# Patient Record
Sex: FEMALE | Race: WHITE
[De-identification: names, ages, dates, MRNs, and addresses within clinical notes are randomized per-mention and may not be internally consistent; named-entity substitution may affect disease eponyms.]

---

## 2018-03-02 ENCOUNTER — HOSPITAL ENCOUNTER (EMERGENCY)
Dept: HOSPITAL 19 - COL.ER | Age: 23
Discharge: HOME | End: 2018-03-02
Payer: OTHER GOVERNMENT

## 2018-03-02 VITALS — SYSTOLIC BLOOD PRESSURE: 123 MMHG | HEART RATE: 78 BPM | DIASTOLIC BLOOD PRESSURE: 70 MMHG

## 2018-03-02 VITALS — WEIGHT: 135.36 LBS | HEIGHT: 67.99 IN | BODY MASS INDEX: 20.52 KG/M2

## 2018-03-02 DIAGNOSIS — F43.10: ICD-10-CM

## 2018-03-02 DIAGNOSIS — Z91.5: ICD-10-CM
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
## 2018-03-02 DIAGNOSIS — F32.9: ICD-10-CM

## 2018-03-02 DIAGNOSIS — F91.8: ICD-10-CM

## 2018-03-02 DIAGNOSIS — F41.9: ICD-10-CM

## 2018-03-02 DIAGNOSIS — S71.111A: Primary | ICD-10-CM

## 2018-03-02 LAB
ALBUMIN SERPL-MCNC: 4 GM/DL (ref 3.5–5)
ALP SERPL-CCNC: 46 U/L (ref 50–136)
ALT SERPL-CCNC: 30 U/L (ref 9–52)
ANION GAP SERPL CALC-SCNC: 10 MMOL/L (ref 7–16)
APAP SERPL-MCNC: < 10 UG/ML (ref 10–30)
AST SERPL-CCNC: 22 U/L (ref 15–37)
BASOPHILS # BLD: 0 10*3/UL (ref 0–0.2)
BASOPHILS NFR BLD AUTO: 0.2 % (ref 0–2)
BILIRUB SERPL-MCNC: 0.3 MG/DL (ref 0–1)
BUN SERPL-MCNC: 9 MG/DL (ref 7–17)
CALCIUM SERPL-MCNC: 9.4 MG/DL (ref 8.4–10.2)
CHLORIDE SERPL-SCNC: 102 MMOL/L (ref 98–107)
CO2 SERPL-SCNC: 25 MMOL/L (ref 22–30)
CREAT SERPL-SCNC: 0.93 MG/DL (ref 0.52–1.25)
DRUGS UR SCN NOM: NEGATIVE NG/ML
EOSINOPHIL # BLD: 0 10*3/UL (ref 0–0.7)
EOSINOPHIL NFR BLD: 0.2 % (ref 0–4)
ERYTHROCYTE [DISTWIDTH] IN BLOOD BY AUTOMATED COUNT: 13.9 % (ref 11.5–14.5)
ETHANOL SPEC-SCNC: < 10 MG/DL
GLUCOSE SERPL-MCNC: 98 MG/DL (ref 74–106)
GRANULOCYTES # BLD AUTO: 59.5 % (ref 42.2–75.2)
HCT VFR BLD AUTO: 36.3 % (ref 37–47)
HGB BLD-MCNC: 11.8 G/DL (ref 12.5–16)
LYMPHOCYTES # BLD: 3.3 10*3/UL (ref 1.2–3.4)
LYMPHOCYTES NFR BLD: 33.2 % (ref 20–51)
MCH RBC QN AUTO: 29 PG (ref 27–31)
MCHC RBC AUTO-ENTMCNC: 33 G/DL (ref 33–37)
MCV RBC AUTO: 88 FL (ref 80–100)
MONOCYTES # BLD: 0.7 10*3/UL (ref 0.1–0.6)
MONOCYTES NFR BLD AUTO: 6.6 % (ref 1.7–9.3)
NEUTROPHILS # BLD: 5.9 10*3/UL (ref 1.4–6.5)
PH UR STRIP.AUTO: 5 [PH] (ref 5–8)
PLATELET # BLD AUTO: 267 K/MM3 (ref 130–400)
PMV BLD AUTO: 9 FL (ref 7.4–10.4)
POTASSIUM SERPL-SCNC: 3.7 MMOL/L (ref 3.4–5)
PROT SERPL-MCNC: 7.2 GM/DL (ref 6.4–8.2)
RBC # BLD AUTO: 4.12 M/MM3 (ref 4.1–5.3)
RBC # UR: (no result) /HPF
SALICYLATES SERPL-MCNC: < 1 MG/DL
SODIUM SERPL-SCNC: 138 MMOL/L (ref 137–145)
SP GR UR STRIP.AUTO: 1.02 (ref 1–1.03)
SQUAMOUS # URNS: (no result) /HPF
URN COLLECT METHOD CLASS: (no result)

## 2020-01-20 ENCOUNTER — HOSPITAL ENCOUNTER (OUTPATIENT)
Dept: HOSPITAL 19 - MC.RAD | Age: 25
End: 2020-01-20
Payer: OTHER GOVERNMENT

## 2020-01-20 DIAGNOSIS — N63.31: Primary | ICD-10-CM

## 2020-07-20 ENCOUNTER — HOSPITAL ENCOUNTER (OUTPATIENT)
Dept: HOSPITAL 19 - MC.RAD | Age: 25
End: 2020-07-20
Payer: OTHER GOVERNMENT

## 2020-07-20 DIAGNOSIS — N63.10: Primary | ICD-10-CM

## 2020-09-09 ENCOUNTER — HOSPITAL ENCOUNTER (OUTPATIENT)
Dept: HOSPITAL 19 - LDR | Age: 25
Discharge: HOME | End: 2020-09-09
Payer: OTHER GOVERNMENT

## 2020-09-09 VITALS — BODY MASS INDEX: 28.23 KG/M2 | HEIGHT: 67.99 IN | WEIGHT: 186.29 LBS

## 2020-09-09 VITALS — TEMPERATURE: 98.5 F | HEART RATE: 75 BPM | DIASTOLIC BLOOD PRESSURE: 74 MMHG | SYSTOLIC BLOOD PRESSURE: 123 MMHG

## 2020-09-09 DIAGNOSIS — Z3A.38: ICD-10-CM

## 2020-09-09 NOTE — NUR
G2L0. 38-5. Ambulatory to [facility] 5 with spouse. Clean gown on. EFM and TOCO
explained and applied. Pt states she has been having contraction since 0700
this morning, states her last one was an hour and half ago. Pt reports pain
that starts in her lower back and radiates to her lower abdomen. Pt denies
leaking of fluids or vaginal bleeding. States she did have "pink tinged"
discharge this morning after using the restroom but nothing since then.
Reports good fetal movement.
1840: SVE 1/50/-3. Assessment completed. Plan of care explained. Call light
within reach.

## 2020-09-09 NOTE — NUR
SVE unchanged.
1942: Pt off monitors to use restroom.
1944: Dr.Goodpasture updated on pt status. See physican notificaiton.
1955: Discharge instructions given to pt and . Questions answered. Pt
ambulatory off unit and home with

## 2020-09-16 ENCOUNTER — HOSPITAL ENCOUNTER (INPATIENT)
Dept: HOSPITAL 19 - LDRO | Age: 25
LOS: 2 days | Discharge: HOME | End: 2020-09-18
Attending: OBSTETRICS & GYNECOLOGY | Admitting: OBSTETRICS & GYNECOLOGY
Payer: OTHER GOVERNMENT

## 2020-09-16 VITALS — TEMPERATURE: 97.5 F | HEART RATE: 89 BPM | SYSTOLIC BLOOD PRESSURE: 119 MMHG | DIASTOLIC BLOOD PRESSURE: 79 MMHG

## 2020-09-16 VITALS — DIASTOLIC BLOOD PRESSURE: 79 MMHG | SYSTOLIC BLOOD PRESSURE: 119 MMHG | HEART RATE: 89 BPM | TEMPERATURE: 97.5 F

## 2020-09-16 VITALS — HEIGHT: 67.99 IN | BODY MASS INDEX: 28.5 KG/M2 | WEIGHT: 188.05 LBS

## 2020-09-16 DIAGNOSIS — Z3A.39: ICD-10-CM

## 2020-09-16 DIAGNOSIS — L40.4: ICD-10-CM

## 2020-09-16 LAB
BASOPHILS # BLD: 0 10*3/UL (ref 0–0.2)
BASOPHILS NFR BLD AUTO: 0.2 % (ref 0–2)
EOSINOPHIL # BLD: 0.1 10*3/UL (ref 0–0.7)
EOSINOPHIL NFR BLD: 0.3 % (ref 0–4)
ERYTHROCYTE [DISTWIDTH] IN BLOOD BY AUTOMATED COUNT: 14.5 % (ref 11.5–14.5)
GRANULOCYTES # BLD AUTO: 77.5 % (ref 42.2–75.2)
HCT VFR BLD AUTO: 40.9 % (ref 37–47)
HGB BLD-MCNC: 13.4 G/DL (ref 12.5–16)
LYMPHOCYTES # BLD: 2.9 10*3/UL (ref 1.2–3.4)
LYMPHOCYTES NFR BLD: 14.4 % (ref 20–51)
MCH RBC QN AUTO: 28 PG (ref 27–31)
MCHC RBC AUTO-ENTMCNC: 33 G/DL (ref 33–37)
MCV RBC AUTO: 86 FL (ref 80–100)
MONOCYTES # BLD: 1.3 10*3/UL (ref 0.1–0.6)
MONOCYTES NFR BLD AUTO: 6.7 % (ref 1.7–9.3)
NEUTROPHILS # BLD: 15.4 10*3/UL (ref 1.4–6.5)
PLATELET # BLD AUTO: 246 K/MM3 (ref 130–400)
PMV BLD AUTO: 8.8 FL (ref 7.4–10.4)
RBC # BLD AUTO: 4.74 M/MM3 (ref 4.1–5.3)

## 2020-09-16 NOTE — NUR
PT TO UNIT AMBULATORY WITH COMPLAINTS OF CONTRACTIONS. PT AND SPOUSE ORIENTED
TO ROOM, CHANGED INTO GOWN, SVE PERFORMED, VS OBTAINED, EFM X2 APPLIED.

## 2020-09-17 VITALS — DIASTOLIC BLOOD PRESSURE: 68 MMHG | TEMPERATURE: 98.8 F | HEART RATE: 81 BPM | SYSTOLIC BLOOD PRESSURE: 116 MMHG

## 2020-09-17 VITALS — HEART RATE: 84 BPM | DIASTOLIC BLOOD PRESSURE: 56 MMHG | SYSTOLIC BLOOD PRESSURE: 128 MMHG

## 2020-09-17 VITALS — SYSTOLIC BLOOD PRESSURE: 91 MMHG | HEART RATE: 82 BPM | DIASTOLIC BLOOD PRESSURE: 50 MMHG

## 2020-09-17 VITALS — SYSTOLIC BLOOD PRESSURE: 107 MMHG | HEART RATE: 77 BPM | DIASTOLIC BLOOD PRESSURE: 65 MMHG

## 2020-09-17 VITALS — HEART RATE: 85 BPM | DIASTOLIC BLOOD PRESSURE: 66 MMHG | SYSTOLIC BLOOD PRESSURE: 117 MMHG

## 2020-09-17 VITALS — SYSTOLIC BLOOD PRESSURE: 102 MMHG | HEART RATE: 64 BPM | DIASTOLIC BLOOD PRESSURE: 51 MMHG

## 2020-09-17 VITALS — HEART RATE: 73 BPM | DIASTOLIC BLOOD PRESSURE: 46 MMHG | SYSTOLIC BLOOD PRESSURE: 85 MMHG

## 2020-09-17 VITALS — HEART RATE: 92 BPM | DIASTOLIC BLOOD PRESSURE: 59 MMHG | SYSTOLIC BLOOD PRESSURE: 103 MMHG

## 2020-09-17 VITALS — TEMPERATURE: 98.2 F | HEART RATE: 81 BPM | DIASTOLIC BLOOD PRESSURE: 69 MMHG | SYSTOLIC BLOOD PRESSURE: 111 MMHG

## 2020-09-17 VITALS — HEART RATE: 85 BPM | DIASTOLIC BLOOD PRESSURE: 61 MMHG | SYSTOLIC BLOOD PRESSURE: 114 MMHG

## 2020-09-17 VITALS — DIASTOLIC BLOOD PRESSURE: 73 MMHG | SYSTOLIC BLOOD PRESSURE: 116 MMHG | HEART RATE: 90 BPM

## 2020-09-17 VITALS — HEART RATE: 72 BPM | DIASTOLIC BLOOD PRESSURE: 76 MMHG | SYSTOLIC BLOOD PRESSURE: 124 MMHG

## 2020-09-17 VITALS — HEART RATE: 86 BPM | SYSTOLIC BLOOD PRESSURE: 95 MMHG | DIASTOLIC BLOOD PRESSURE: 50 MMHG

## 2020-09-17 VITALS — HEART RATE: 81 BPM | DIASTOLIC BLOOD PRESSURE: 63 MMHG | SYSTOLIC BLOOD PRESSURE: 117 MMHG

## 2020-09-17 VITALS — DIASTOLIC BLOOD PRESSURE: 64 MMHG | TEMPERATURE: 98.3 F | HEART RATE: 78 BPM | SYSTOLIC BLOOD PRESSURE: 114 MMHG

## 2020-09-17 VITALS — HEART RATE: 70 BPM | SYSTOLIC BLOOD PRESSURE: 108 MMHG | DIASTOLIC BLOOD PRESSURE: 51 MMHG

## 2020-09-17 VITALS — HEART RATE: 77 BPM | SYSTOLIC BLOOD PRESSURE: 127 MMHG | DIASTOLIC BLOOD PRESSURE: 69 MMHG

## 2020-09-17 VITALS — SYSTOLIC BLOOD PRESSURE: 120 MMHG | HEART RATE: 90 BPM | DIASTOLIC BLOOD PRESSURE: 75 MMHG

## 2020-09-17 VITALS — HEART RATE: 86 BPM | SYSTOLIC BLOOD PRESSURE: 120 MMHG | DIASTOLIC BLOOD PRESSURE: 65 MMHG

## 2020-09-17 VITALS — HEART RATE: 97 BPM | SYSTOLIC BLOOD PRESSURE: 118 MMHG | DIASTOLIC BLOOD PRESSURE: 70 MMHG

## 2020-09-17 VITALS — DIASTOLIC BLOOD PRESSURE: 57 MMHG | HEART RATE: 81 BPM | SYSTOLIC BLOOD PRESSURE: 100 MMHG

## 2020-09-17 VITALS — HEART RATE: 76 BPM | DIASTOLIC BLOOD PRESSURE: 70 MMHG | SYSTOLIC BLOOD PRESSURE: 118 MMHG

## 2020-09-17 VITALS — SYSTOLIC BLOOD PRESSURE: 95 MMHG | HEART RATE: 88 BPM | DIASTOLIC BLOOD PRESSURE: 54 MMHG

## 2020-09-17 VITALS — DIASTOLIC BLOOD PRESSURE: 58 MMHG | HEART RATE: 80 BPM | SYSTOLIC BLOOD PRESSURE: 106 MMHG

## 2020-09-17 VITALS — SYSTOLIC BLOOD PRESSURE: 117 MMHG | HEART RATE: 85 BPM | DIASTOLIC BLOOD PRESSURE: 63 MMHG

## 2020-09-17 VITALS — HEART RATE: 74 BPM | DIASTOLIC BLOOD PRESSURE: 73 MMHG | SYSTOLIC BLOOD PRESSURE: 119 MMHG

## 2020-09-17 VITALS — HEART RATE: 77 BPM | DIASTOLIC BLOOD PRESSURE: 61 MMHG | SYSTOLIC BLOOD PRESSURE: 115 MMHG

## 2020-09-17 VITALS — DIASTOLIC BLOOD PRESSURE: 55 MMHG | HEART RATE: 103 BPM | SYSTOLIC BLOOD PRESSURE: 106 MMHG

## 2020-09-17 VITALS — TEMPERATURE: 98.3 F | SYSTOLIC BLOOD PRESSURE: 118 MMHG | DIASTOLIC BLOOD PRESSURE: 70 MMHG | HEART RATE: 91 BPM

## 2020-09-17 VITALS — DIASTOLIC BLOOD PRESSURE: 72 MMHG | HEART RATE: 93 BPM | TEMPERATURE: 98.7 F | SYSTOLIC BLOOD PRESSURE: 117 MMHG

## 2020-09-17 VITALS — DIASTOLIC BLOOD PRESSURE: 59 MMHG | SYSTOLIC BLOOD PRESSURE: 101 MMHG | HEART RATE: 88 BPM

## 2020-09-17 VITALS — DIASTOLIC BLOOD PRESSURE: 51 MMHG | SYSTOLIC BLOOD PRESSURE: 86 MMHG | HEART RATE: 82 BPM

## 2020-09-17 VITALS — SYSTOLIC BLOOD PRESSURE: 103 MMHG | DIASTOLIC BLOOD PRESSURE: 50 MMHG | HEART RATE: 75 BPM

## 2020-09-17 VITALS — SYSTOLIC BLOOD PRESSURE: 124 MMHG | HEART RATE: 96 BPM | DIASTOLIC BLOOD PRESSURE: 71 MMHG

## 2020-09-17 VITALS — HEART RATE: 82 BPM | SYSTOLIC BLOOD PRESSURE: 116 MMHG | DIASTOLIC BLOOD PRESSURE: 72 MMHG

## 2020-09-17 VITALS — HEART RATE: 64 BPM | SYSTOLIC BLOOD PRESSURE: 105 MMHG | DIASTOLIC BLOOD PRESSURE: 54 MMHG

## 2020-09-17 VITALS — HEART RATE: 76 BPM | SYSTOLIC BLOOD PRESSURE: 114 MMHG | DIASTOLIC BLOOD PRESSURE: 64 MMHG | TEMPERATURE: 98.1 F

## 2020-09-17 VITALS — TEMPERATURE: 98.1 F | DIASTOLIC BLOOD PRESSURE: 59 MMHG | HEART RATE: 78 BPM | SYSTOLIC BLOOD PRESSURE: 102 MMHG

## 2020-09-17 VITALS — SYSTOLIC BLOOD PRESSURE: 125 MMHG | HEART RATE: 112 BPM | DIASTOLIC BLOOD PRESSURE: 68 MMHG

## 2020-09-17 VITALS — HEART RATE: 78 BPM | SYSTOLIC BLOOD PRESSURE: 103 MMHG | DIASTOLIC BLOOD PRESSURE: 59 MMHG

## 2020-09-17 VITALS — SYSTOLIC BLOOD PRESSURE: 113 MMHG | HEART RATE: 95 BPM | DIASTOLIC BLOOD PRESSURE: 69 MMHG

## 2020-09-17 VITALS — HEART RATE: 82 BPM | DIASTOLIC BLOOD PRESSURE: 76 MMHG | SYSTOLIC BLOOD PRESSURE: 132 MMHG

## 2020-09-17 VITALS — DIASTOLIC BLOOD PRESSURE: 72 MMHG | SYSTOLIC BLOOD PRESSURE: 112 MMHG | HEART RATE: 78 BPM

## 2020-09-17 VITALS — SYSTOLIC BLOOD PRESSURE: 98 MMHG | HEART RATE: 83 BPM | DIASTOLIC BLOOD PRESSURE: 56 MMHG

## 2020-09-17 VITALS — SYSTOLIC BLOOD PRESSURE: 127 MMHG | DIASTOLIC BLOOD PRESSURE: 78 MMHG | HEART RATE: 102 BPM

## 2020-09-17 VITALS — HEART RATE: 98 BPM | SYSTOLIC BLOOD PRESSURE: 119 MMHG | DIASTOLIC BLOOD PRESSURE: 65 MMHG

## 2020-09-17 VITALS — HEART RATE: 76 BPM | DIASTOLIC BLOOD PRESSURE: 57 MMHG | SYSTOLIC BLOOD PRESSURE: 104 MMHG

## 2020-09-17 VITALS — HEART RATE: 78 BPM

## 2020-09-17 VITALS — SYSTOLIC BLOOD PRESSURE: 105 MMHG | HEART RATE: 85 BPM | DIASTOLIC BLOOD PRESSURE: 61 MMHG

## 2020-09-17 PROCEDURE — 0KQM0ZZ REPAIR PERINEUM MUSCLE, OPEN APPROACH: ICD-10-PCS | Performed by: OBSTETRICS & GYNECOLOGY

## 2020-09-17 NOTE — NUR
0030  RETURNED TO BED AND EFM ON AFTER UP AMB AND SITTING ON BIRTHING BALL.
0040  WANTS EPIDURAL.  IV FLUIDS STARTED AND CRNA NOTIFIED.

## 2020-09-17 NOTE — NUR
0830  PATIENT VOMITS AT THIS TIME. PATIENT VERY UNCOMFORTABLE, BREATHS THROUGH
CONTRACTIONS. ENCOURAGED PATIENT TO PRESS EPIDURAL MED BUTTON.

## 2020-09-17 NOTE — NUR
0000  PULSE OX ON FOR 20 MINUTES.  VERY UNCOMFORTABLE WITH CONTRACTIONS.  SVE
WITH NO CERVICAL CHANGE.  EFM OFF AND UP TO BR AND AMB IN HALLWAY FOR 30
MINUTES.

## 2020-09-17 NOTE — NUR
0950 PATIENT FEELING PRESSURE. SVE 10/100/+2 DR PICKARD CALLED TO COME FOR
DELIVERY.
1000 DR PICKARD HERE. PATIENT PUSHES WITH CONTRACTIONS.
1004 BABY GIRL BORN VIA  BY DR PICKARD. BABY TO MOMS CHEST SKIN TO SKIN.
CORD CLAMPED AND CUT BY . AND . BABY REMAINS SKIN TO SKIN. BLEEDING
WNL.
1010 PLACENTA DELIVERED. AND PITOCIN STARTED /HR PER PROTOCOL. FUNDUS
FIRM AN DBLEEDING WNL. 2 DEGREE TEAR REPAIRED BY DR PICKARD AT THIS TIME.
PATIENT TOLERATES WELL. FUNDUS FIRM AND BLEEDING WNL

## 2020-09-18 ENCOUNTER — HOSPITAL ENCOUNTER (OUTPATIENT)
Dept: HOSPITAL 19 - ZCOL.LAB | Age: 25
End: 2020-09-18
Attending: OBSTETRICS & GYNECOLOGY
Payer: OTHER GOVERNMENT

## 2020-09-18 VITALS — HEART RATE: 84 BPM | DIASTOLIC BLOOD PRESSURE: 70 MMHG | SYSTOLIC BLOOD PRESSURE: 114 MMHG

## 2020-09-18 VITALS — DIASTOLIC BLOOD PRESSURE: 67 MMHG | SYSTOLIC BLOOD PRESSURE: 119 MMHG | HEART RATE: 80 BPM

## 2020-09-18 VITALS — TEMPERATURE: 98 F | DIASTOLIC BLOOD PRESSURE: 68 MMHG | HEART RATE: 90 BPM | SYSTOLIC BLOOD PRESSURE: 114 MMHG

## 2020-09-18 DIAGNOSIS — Z53.9: Primary | ICD-10-CM

## 2020-09-18 LAB
HCT VFR BLD AUTO: 34.9 % (ref 37–47)
HGB BLD-MCNC: 11.4 G/DL (ref 12.5–16)